# Patient Record
Sex: MALE | Race: WHITE
[De-identification: names, ages, dates, MRNs, and addresses within clinical notes are randomized per-mention and may not be internally consistent; named-entity substitution may affect disease eponyms.]

---

## 2017-04-06 ENCOUNTER — HOSPITAL ENCOUNTER (OUTPATIENT)
Dept: HOSPITAL 45 - C.CTS | Age: 72
Discharge: HOME | End: 2017-04-06
Attending: OTOLARYNGOLOGY
Payer: COMMERCIAL

## 2017-04-06 DIAGNOSIS — J34.2: Primary | ICD-10-CM

## 2017-04-06 NOTE — DIAGNOSTIC IMAGING REPORT
Study: Fusion CT of the sinuses



HISTORY:. Nasal septal deviation. Chronic sinusitis.



FINDINGS: Findings of prior antral window placement. This is patent on the

right, with soft tissue occlusion of the left. The native ostiomeatal units are

patent bilaterally.



Prior partial nasal turbinate resection on the right.. Mild hypertrophic change

of the left and to a lesser extent inferior residual right nasal turbinates.

Mild nasal occlusive change. Very slight nasal septal deviation to the left. The

frontal and sphenoid sinuses are clear.



IMPRESSION: Prior partial right nasal turbinate resection.

2. Mild/moderate hypertrophic change of the residual nasal turbinates.

Mild/moderate nasal occlusive change.





3. Soft tissue occlusion of the antral windows previous replaced with the

ostiomeatal units patent bilaterally.

4. Sinuses specifically are generally clear with only minimal mucosal thickening

of the left maxillary sinus.

5. Minimal nasal septal displacement to the left.







Electronically signed by:  Christian Velasco M.D.

4/6/2017 9:38 AM



Dictated Date/Time:  4/6/2017 9:32 AM

## 2017-04-09 ENCOUNTER — HOSPITAL ENCOUNTER (EMERGENCY)
Dept: HOSPITAL 45 - C.EDB | Age: 72
Discharge: HOME | End: 2017-04-09
Payer: COMMERCIAL

## 2017-04-09 VITALS
WEIGHT: 173.48 LBS | HEIGHT: 64.02 IN | BODY MASS INDEX: 29.62 KG/M2 | HEIGHT: 64.02 IN | WEIGHT: 173.48 LBS | BODY MASS INDEX: 29.62 KG/M2

## 2017-04-09 VITALS — HEART RATE: 72 BPM | DIASTOLIC BLOOD PRESSURE: 76 MMHG | OXYGEN SATURATION: 100 % | SYSTOLIC BLOOD PRESSURE: 118 MMHG

## 2017-04-09 VITALS — TEMPERATURE: 97.7 F

## 2017-04-09 DIAGNOSIS — M75.31: Primary | ICD-10-CM

## 2017-04-09 DIAGNOSIS — M25.511: ICD-10-CM

## 2017-04-09 DIAGNOSIS — I10: ICD-10-CM

## 2017-04-09 DIAGNOSIS — Z96.651: ICD-10-CM

## 2017-04-09 NOTE — EMERGENCY ROOM VISIT NOTE
ED Visit Note


First contact with patient:  11:02


CHIEF COMPLAINT: Right shoulder pain





HISTORY OF PRESENT ILLNESS:  This 71-year-old male presents the ER with chief 

complaint of right shoulder pain.  The patient states that his pain started 2 

days ago.  He denies any known injury.  He does admit that he was driving a lot 

prior to the onset of his symptoms.  The patient was seen at [facility] last 

evening and had an x-ray of the shoulder.  He was told he has calcific 

tendinitis.  He was instructed to [facility] or he 

has been seen in the past.  The patient states that also gave him a 

prescription for naproxen 500 mg.  He states the pharmacy was not open last 

night so this morning he took 2 over-the-counter naproxen which equaled 440 mg 

without any relief of the shoulder pain.  The patient denies any pain radiating 

down his arms or any numbness and tickling in his fingers.





REVIEW OF SYSTEMS:   6 system review was performed and was negative unless 

stated otherwise in history of present illness.





PMH:  The patient is healthy; right knee replacement, 2 spinal surgeries, sinus 

surgery, hypertension, kidney stones





SOCIAL HISTORY:  Patient lives with his wife.  The patient denies any tobacco 

or alcohol use.





PHYSICAL EXAM: Vital Signs: Were reviewed Reviewed nurse's notes.  GEN.: 71-year

-old white male appears uncomfortable secondary to shoulder pain .MENTAL Status

: Alert and oriented 3.  RIGHT shoulder: No gross bony deformity noted.  The 

patient is holding his arm down by his side.  The patient has tenderness 

palpation over the superior aspect of the shoulder joint and extending medial 

towards the right side of his neck.  The patient has limited range of motion in 

all directions due to pain.   strength is 5 out of 5 as compared to the 

left.





EMERGENCY DEPARTMENT COURSE: The patient was evaluated.  I reviewed the patient'

s handout from [facility].  The patient was given Toradol 60 mg IM and 

Norco 5/325 mg 2 tablets by mouth for pain.  The patient was reevaluated and 

was feeling better..  The patient was independently evaluated by Dr. Barrett 

who agree with treatment plan.  The patient was discharged home in stable 

condition.


 


DIAGNOSIS: Calcific tendinitis right shoulder





DISCHARGE INSTRUCTIONS & TREATMENT: Apply ice and/or heat intermittently to the 

affected area, whichever makes it feel better.  Take both the naproxen and the 

Norco as directed for pain.  Do not drive while taking the Norco.  Recommend 

range of motion exercises several times a day to prevent stiffness.  Call 

[facility] on Monday as previously directed for a follow-up 

appointment.


Current/Historical Medications


Scheduled


Amlodipine (Norvasc), 5 MG PO DAILY


Amlodipine (Norvasc), 10 MG PO DAILY


Atorvastatin (Lipitor), 5 MG PO Q2D


Carvedilol (Coreg), 12.5 MG PO BID


Cetirizine (Zyrtec), 10 MG PO DAILY


Diphenoxylate/Atropine (Lomotil), 1 TAB PO UD


Lorazepam (Ativan), 0.5-1 MG PO HS





Scheduled PRN


Calcium Carbonate (Antacid) (Antacid Calcium Regular S), 1 TAB PO UD PRN for GI 

Upset





Allergies


Coded Allergies:  


     Pravastatin (Verified  Allergy, Mild, HIVES, 2/5/10)


     Codeine (Unverified  Allergy, Unknown, VOMIT, 5/3/11)


     Molds and Smuts (Verified  Allergy, Unknown, unk, 4/9/17)


     Eszopiclone (Verified  Adverse Reaction, Intermediate, RESTLESS LEGS, 2/5/

10)


 RESTLESS LEG SYNDROME


     Simvastatin (Verified  Adverse Reaction, Intermediate, GI INTOLERANCE, 2/5/

10)


 GI UPSET





Vital Signs











  Date Time  Temp Pulse Resp B/P Pulse Ox O2 Delivery O2 Flow Rate FiO2


 


4/9/17 10:58 36.5 74 18 127/73 94 Room Air  











Medications Administered











 Medications


  (Trade)  Dose


 Ordered  Sig/Bhumika


 Route  Start Time


 Stop Time Status Last Admin


Dose Admin


 


 Ketorolac


 Tromethamine


  (Toradol Inj)  60 mg  NOW  STAT


 IM  4/9/17 11:09


 4/9/17 11:11 DC 4/9/17 11:25


60 MG


 


 Acetaminophen/


 Hydrocodone Bitart


  (Norco 5/325 Tab)  2 tab  NOW  STAT


 PO  4/9/17 11:09


 4/9/17 11:11 DC 4/9/17 11:25


2 TAB











Departure Information


Referrals


Thaddeus Funez M.D. (PCP)





Patient Instructions


[facility]

## 2017-04-11 NOTE — EMERGENCY ROOM VISIT NOTE
ED Visit Note


First contact with patient:  11:02


I have personally seen and evaluated the patient with the PA.  I agree with the 

diagnosis and management decisions and have been personally involved in the 

case. Please see Elizabeth Bell PA-C's notes for further details of the history, 

physical and visit.

## 2017-05-24 ENCOUNTER — HOSPITAL ENCOUNTER (OUTPATIENT)
Dept: HOSPITAL 45 - C.LABPBG | Age: 72
Discharge: HOME | End: 2017-05-24
Attending: OTOLARYNGOLOGY
Payer: COMMERCIAL

## 2017-05-24 DIAGNOSIS — Z01.818: Primary | ICD-10-CM

## 2017-05-24 LAB
ANION GAP SERPL CALC-SCNC: 8 MMOL/L (ref 3–11)
BASOPHILS # BLD: 0.04 K/UL (ref 0–0.2)
BASOPHILS NFR BLD: 0.5 %
CHLORIDE SERPL-SCNC: 105 MMOL/L (ref 98–107)
CO2 SERPL-SCNC: 25 MMOL/L (ref 21–32)
COMPLETE: YES
EOSINOPHIL NFR BLD AUTO: 305 K/UL (ref 130–400)
HCT VFR BLD CALC: 41.6 % (ref 42–52)
IG%: 0.3 %
IMM GRANULOCYTES NFR BLD AUTO: 23.2 %
INR PPP: 1 (ref 0.9–1.1)
LYMPHOCYTES # BLD: 1.85 K/UL (ref 1.2–3.4)
MCH RBC QN AUTO: 29.8 PG (ref 25–34)
MCHC RBC AUTO-ENTMCNC: 33.7 G/DL (ref 32–36)
MCV RBC AUTO: 88.5 FL (ref 80–100)
MONOCYTES NFR BLD: 8.8 %
NEUTROPHILS # BLD AUTO: 1.1 %
NEUTROPHILS NFR BLD AUTO: 66.1 %
PARTIAL THROMBOPLASTIN RATIO: 1
PMV BLD AUTO: 9 FL (ref 7.4–10.4)
POTASSIUM SERPL-SCNC: 3.7 MMOL/L (ref 3.5–5.1)
PROTHROMBIN TIME: 10.9 SECONDS (ref 9–12)
RBC # BLD AUTO: 4.7 M/UL (ref 4.7–6.1)
SODIUM SERPL-SCNC: 138 MMOL/L (ref 136–145)
WBC # BLD AUTO: 7.98 K/UL (ref 4.8–10.8)

## 2017-06-05 ENCOUNTER — HOSPITAL ENCOUNTER (OUTPATIENT)
Dept: HOSPITAL 45 - X.SURG | Age: 72
Discharge: HOME | End: 2017-06-05
Attending: OTOLARYNGOLOGY
Payer: COMMERCIAL

## 2017-06-05 VITALS — TEMPERATURE: 97.52 F

## 2017-06-05 VITALS — OXYGEN SATURATION: 95 % | SYSTOLIC BLOOD PRESSURE: 187 MMHG | DIASTOLIC BLOOD PRESSURE: 79 MMHG | HEART RATE: 75 BPM

## 2017-06-05 VITALS
BODY MASS INDEX: 27.72 KG/M2 | WEIGHT: 162.35 LBS | BODY MASS INDEX: 27.72 KG/M2 | HEIGHT: 64.02 IN | WEIGHT: 162.35 LBS | HEIGHT: 64.02 IN

## 2017-06-05 DIAGNOSIS — J34.2: ICD-10-CM

## 2017-06-05 DIAGNOSIS — J32.9: Primary | ICD-10-CM

## 2017-06-05 DIAGNOSIS — E78.5: ICD-10-CM

## 2017-06-05 DIAGNOSIS — E78.00: ICD-10-CM

## 2017-06-05 DIAGNOSIS — J34.3: ICD-10-CM

## 2017-06-05 DIAGNOSIS — I10: ICD-10-CM

## 2017-06-05 DIAGNOSIS — J34.89: ICD-10-CM

## 2017-06-05 DIAGNOSIS — K21.9: ICD-10-CM

## 2017-06-05 NOTE — DISCHARGE INSTRUCTIONS
Discharge Instructions


Date of Service


Jun 5, 2017.





Admission


Reason for Admission:  Chronic Sinusitis, Nasal Septal Deviation, Hypertr





Discharge


Discharge Diagnosis / Problem:  SAME





Discharge Goals


Goal(s):  Therapeutic intervention





Activity Recommendations


Activity Limitations:  as noted below


1. LIGHT ACTIVITY AND NO NOSE BLOWING FOR 2 WEEKS


2. NO DRIVING WHILE ON NORCO


.





Current Hospital Diet


Patient's current hospital diet:





Discharge Diet


Recommended Diet:  Regular Diet





Procedures


Procedures Performed:  


REVISION BILATERAL ENDOSCOPIC SINUS SURGERY, REVISION SEPTOPLASTY,


REVISION BILATERAL INFERIOR TURBINATE REDUCTION, ENDOSCOPIC LEFT NASAL


LYSIS OF ADHESIONS





Pending Studies


Studies pending at discharge:  no





Medical Emergencies








.


Who to Call and When:





Medical Emergencies:  If at any time you feel your situation is an emergency, 

please call 911 immediately.





.





Non-Emergent Contact


Non-Emergency issues call your:  Surgeon





.


.








"Provider Documentation" section prepared by Kayden Mitchell.








.





VTE Core Measure


Inpt VTE Proph given/why not?:  SCD's

## 2017-06-05 NOTE — [facility] OPERATIVE REPORT
Operative Report


Operative Date


Jun 5, 2017.





Pre-Operative Diagnosis





Chronic Sinusitis, Nasal Septal Deviation, Bilateral Inferior Turbinate 


Hypertrophy, Left Nasal Adhesions.





Post-Operative Diagnosis





same as preop





Procedure(s) Performed





REVISION BILATERAL ENDOSCOPIC SINUS SURGERY, REVISION SEPTOPLASTY,


REVISION BILATERAL INFERIOR TURBINATE REDUCTION, ENDOSCOPIC LEFT NASAL


LYSIS OF ADHESIONS





Surgeon


Dr. Mitchell





Assistant Surgeon(s)


none





Estimated Blood Loss


10ML





Findings


1. BILATERAL ROSS BULLOSA WITH LEFT MIDDLE TURBINATE SCARRED DOWN TO LATERAL 

NASAL WALL


2. ADHESION BETWEEN LEFT SEPTUM AND INFERIOR TURBINATE


3. THICKENED NASAL SEPTUM CAUSING LEFT ANTEROSUPERIOR SEPTAL DEVIATION


4. BILATERAL INFERIOR TURBINATE HYPERTROPHY





Specimens





none per surgeon


I attest to the content of the Intraoperative Record and any orders documented 

therein.  Any exceptions are noted below.

## 2017-06-05 NOTE — ANESTHESIA PROGRESS NT - [facility]
Anesthesia Post Op Note


Date & Time


Jun 5, 2017 at 12:49





Vital Signs


Pain Intensity:  0





Vital Signs Past 12 Hours








  Date Time  Temp Pulse Resp B/P (MAP) Pulse Ox O2 Delivery O2 Flow Rate FiO2


 


6/5/17 12:28  73 21  85   


 


6/5/17 12:28  72 21     


 


6/5/17 12:27    163/81    


 


6/5/17 12:25 36.6 71 14 163/81 97 Room Air  


 


6/5/17 12:23  71 18  94   


 


6/5/17 12:23  71 18     


 


6/5/17 12:22    172/86    


 


6/5/17 12:18  69 12  93   


 


6/5/17 12:18  68 12     


 


6/5/17 12:17    148/86    


 


6/5/17 12:15    167/80    


 


6/5/17 12:13  67 8     


 


6/5/17 12:13  65 8  96   


 


6/5/17 12:12    177/112    


 


6/5/17 12:08  63 5     


 


6/5/17 12:08  64 5  96   


 


6/5/17 12:07    163/90    


 


6/5/17 12:03  65 15     


 


6/5/17 12:03  64 15  95   


 


6/5/17 12:02    159/89    


 


6/5/17 11:58  67 14     


 


6/5/17 11:58  67 14  95   


 


6/5/17 11:57    160/90    


 


6/5/17 11:53  62 10  94   


 


6/5/17 11:53  62 10     


 


6/5/17 11:52    165/92    


 


6/5/17 11:48  71 11     


 


6/5/17 11:48  72 11  96   


 


6/5/17 11:47    167/90    


 


6/5/17 11:46  67 15  95   


 


6/5/17 11:46  68 15     


 


6/5/17 11:42    167/94    


 


6/5/17 11:41  70 16     


 


6/5/17 11:41  69 16  95   


 


6/5/17 11:38    165/89    


 


6/5/17 11:36 36.4 77 16 165/89 96 Humidified Oxygen 6 





      Diffusion Mask  


 


6/5/17 08:55 36.3 67 20 166/92 (116) 95 Room Air  











Notes


Mental Status:  alert / awake / arousable, participated in evaluation


Pt Amnestic to Procedure:  Yes


Nausea / Vomiting:  adequately controlled


Pain:  adequately controlled


Airway Patency, RR, SpO2:  stable & adequate


BP & HR:  stable & adequate


Hydration State:  stable & adequate


Anesthetic Complications:  no major complications apparent

## 2017-06-05 NOTE — HISTORY AND PHYSICAL: SURG CNT
History & Physical


Date


Jun 5, 2017.





Chief Complaint


CHRONIC SINUSITIS, SEPTAL DEVIATION, BILATERAL INFERIOR TURBINATE HYPERTROPHY, 

LEFT NASAL ADHESIONS





History of Present Illness


The patient is a 71 year old male with complaints of CHRONIC SINUSITIS, SEPTAL 

DEVIATION, BILATERAL INFERIOR TURBINATE HYPERTROPHY, LEFT NASAL ADHESIONS 

UNRESPONSIVE TO MAXIMAL MEDICAL RX.








Past Medical/Surgical History


PMH:  ANXIETY, DYSLIPIDEMIA, HTN, IBS, LUMBAR STENOSIS


PSH: S/P B FESS, S/P LUMBAR LAMINECTOMY, S/P KNEE REPLACEMENT, S/P R KNEE 

ARTHROSCOPY





Additional History


Hepatic Disease:  No


Endocrine Disorder:  No


Kidney Disease:  No


Hypertension:  No


Heart Disease:  No


Bleeding Tendencies:  No


Infectious Diseases:  No





Allergies


Coded Allergies:  


     Pravastatin (Verified  Allergy, Mild, HIVES, 6/5/17)


     Molds and Smuts (Verified  Allergy, Unknown, unk, 6/5/17)


     Eszopiclone (Verified  Adverse Reaction, Intermediate, RESTLESS LEGS, 6/5/ 17)


 RESTLESS LEG SYNDROME


     Simvastatin (Verified  Adverse Reaction, Intermediate, GI INTOLERANCE, 6/5/ 17)


 GI UPSET


     Codeine (Verified  Adverse Reaction, Unknown, VOMIT, 6/5/17)





Home Medications


Scheduled


Amlodipine (Norvasc), 5 MG PO QAM


Atorvastatin (Lipitor), 5 MG PO Q2D


B-Complex Vitamins (Vitamin B Complex), 1 TAB PO QPM


Carvedilol (Coreg), 1 TAB PO BID


Celecoxib (CeleBREX), 200 MG PO QAM


Cetirizine (Zyrtec), 10 MG PO QPM


Cholecalciferol (Vitamin D), 1 TAB PO QPM


Cinnamon (Cinnamon), 1 CAP PO QPM


Lorazepam (Ativan), 0.5-1 MG PO HS


Magnesium Oxide (Magnesium), 1 TAB PO QPM


Misc Natural Products ([place]), 1 CAP PO QPM


Multivitamin (Multivitamin), 1 TAB PO QPM


Omeprazole (Prilosec), 20 MG PO QAM


Potassium (Potassium), 1 TAB PO QAM





Physical Examination


Skin:  warm/dry, no rash


Eyes:  normal inspection, EOMI, sclerae normal


ENT:  + pertinent finding (HYPONASAL VOICE, L DNS WITH ADHESION BETWEEEN SEPTUM 

AND INFERIOR TURBINATE, R>L ITH)


Head:  normocephalic, atraumatic


Neck:  supple, no adenopathy, trachea midline


Respiratory/Chest:  lungs clear, normal breath sounds, no respiratory distress


Cardiovascular:  regular rate, rhythm, no edema, no murmur


Neurologic/Psych:  no motor/sensory deficits, alert, normal reflexes, oriented 

x 3





Diagnosis


CHRONIC SINUSITIS, SEPTAL DEVIATION, BILATERAL INFERIOR TURBINATE HYPERTROPHY, 

LEFT NASAL ADHESIONS





Plan of Treatment


REVISION B FESS/SEPTOPLASTY/INFERIOR TURBINATE REDUCTION

## 2017-06-05 NOTE — OPERATIVE REPORT
DATE OF OPERATION:  06/05/2017

 

PREOPERATIVE DIAGNOSES:

1.  Chronic rhinosinusitis.

2.  Left septal deviation.

3.  Bilateral inferior turbinate hypertrophy.

4.  Left sinonasal adhesions.

 

POSTOPERATIVE DIAGNOSES:

1.  Chronic rhinosinusitis.

2.  Left septal deviation.

3.  Bilateral inferior turbinate hypertrophy.

4.  Left sinonasal adhesions.

 

PROCEDURE:  Revision, bilateral endoscopic sinus surgery consisting of:

1.  Bilateral revision maxillary antrostomies.

2.  Bilateral revision complete ethmoidectomies.

3.  Bilateral endoscopic bakari bullosa resection.

4.  Left nasal lysis of adhesions/debridement.

5.  Septoplasty.

6.  Bilateral inferior turbinate outfracture and turbinoplasty.

 

SURGEON:  Kayden Mitchell MD

 

ANESTHESIA:  General endotracheal.

 

ESTIMATED BLOOD LOSS:  10 mL.

 

FINDINGS:

1.  Very narrow nostrils bilaterally.

2.  Left septal deviation anterosuperiorly.

3.  Adhesion between the left septum and the inferior turbinate.

4.  Adhesion between the left middle turbinate and lateral nasal wall

blocking the left maxillary antrostomy and complete ethmoidectomy that was

performed by another surgeon.

5.  Narrow bilateral maxillary antrostomies.

6.  Mild mucosal thickening involving the bilateral ethmoid sinuses.

7.  Bilateral bakari bullosa.

 

SPECIMENS:  None.

 

COMPLICATIONS:  None.

 

INDICATIONS FOR THE PROCEDURE:  The patient is a 71-year-old male who has

undergone endoscopic sinus surgery, septoplasty, inferior turbinate reduction

by another otolaryngologist in the past.  He has continued to have problems

primarily with left-sided sinonasal congestion and also recurrent sinus

infections.  A posttreatment CT scan sinuses revealed an ____ as well as on

exam revealed an adhesion between the left nasal septum and the left inferior

turbinate.  There was also an adhesion between the left middle turbinate and

lateral nasal wall, blocking the ethmoid cavity and maxillary sinus

antrostomy.  There was bilateral bakari bullosa.  There is mild mucosal

thickening within the left maxillary sinus.  The patient presents for the

above-mentioned procedures on an outpatient elective basis.

 

DETAILS OF PROCEDURE:  After informed consent had been obtained from the

patient, the patient was wheeled to the operating room and placed on the

operating table in the supine position.  Monitors were placed.  After

induction of general endotracheal anesthesia, the patient was prepped in the

usual fashion for endoscopic sinus surgery.  Lidocaine and epinephrine

pledgets were placed in the bilateral nasal cavities and pressure applied.

 

The left-sided pledgets were removed.  Endoscopically, there was an adhesion

between the septum and the inferior turbinate, which was lysed using a freer

elevator.  There was also an adhesion between the left middle turbinate and

lateral nasal wall, which was also lysed with a freer elevator after first

injecting the middle turbinate and lateral nasal wall with 1% lidocaine with

1:100,000 epinephrine.  The middle turbinate was then medialized.  Lidocaine

and epinephrine pledget was then placed in the left middle meatus.

 

The right side was then addressed.  The right middle turbinate and lateral

nasal wall injected with 1% lidocaine with 1:100,000 epinephrine.  A

lidocaine and epinephrine pledget was then placed in the right middle meatus.

 

The left pledget was removed.  Powered instrumentation was used to remove the

scar tissue and adhesion between the left middle turbinate and lateral nasal

wall.  There was some small residual uncinate process, which was removed

using powered instrumentation.  An endoscopic bakari bullosa resection was

then performed using straight Yasir-Cut forceps and powered instrumentation

removing the lateral half of the left middle turbinate.  The previous left

maxillary antrostomy was quite narrow and this was enlarged anteriorly,

inferiorly, and posteriorly using straight Yasir-Cut forceps, and powered

instrumentation.  A revision complete ethmoidectomy was then performed using

powered instrumentation.  A lidocaine and epinephrine pledget was then placed

in the left ethmoid cavity.

 

The right side was then addressed.  On this side, there was also a small

bakari bullosa which was removed as described on the left hand side.  The

previous right maxillary antrostomy was also enlarged as well as and revision

complete ethmoidectomy was performed, as described on the left hand side.

 

The nasal septum was then injected with 1% lidocaine with 1:100,000

epinephrine.  Lidocaine and epinephrine pledgets were placed in the bilateral

nasal cavities and pressure applied.  After allowing adequate time for

vasoconstriction, the pledgets were removed and a #15 scalpel was used to

make a left [place] incision through which a left-sided mucoperichondrial

mucoperiosteal flap was elevated.  A #15 scalpel was then used to incise the

quadrangular cartilage with care to preserve a 1.5 cm dorsal and caudal

strut.  The right-sided mucoperichondrial mucoperiosteal flap was then

elevated.  Of note, the patient has very thickened septal cartilage, which

was impinging on the nasal airway anterosuperiorly on the left hand side. 

Ross-Jimenes forceps was used to remove deviated portions of the

quadrangular cartilage as well as septal bone which was causing this

obstruction.  The septal cavity was then suctioned.  The left [place]

incision was closed with several simple interrupted 4-0 chromic sutures.  A

4-0 plain gut suture on a Harvinder needle was then used to perform a quilting

stitch of the mucoperichondrial mucoperiosteal flaps bilaterally to help

prevent septal hematoma.

 

A Maya elevator was then used to infracture and subsequently outfracture

the inferior turbinates bilaterally.  The inferior turbinates were injected

with 1% lidocaine with 1:100,000 epinephrine.  A 2.0 mm turbinate blade using

powered instrumentation was then used to perform bilateral inferior

turbinoplasties in a submucosal fashion.

 

The sinonasal cavities and nasopharynx were then suctioned.  Merogel was

placed in the bilateral ethmoid cavities.  An orogastric tube was placed and

the stomach was suctioned free of air and stomach contents.

 

This marked the end of the case.  The patient tolerated the procedure well. 

There were no apparent complications.  The patient was extubated and

transferred to recovery room in stable condition.

 

 

I attest to the content of the Intraoperative Record and any orders documented therein. Any exception
s are noted below.

## 2017-07-11 ENCOUNTER — HOSPITAL ENCOUNTER (OUTPATIENT)
Dept: HOSPITAL 45 - C.LABPBG | Age: 72
Discharge: HOME | End: 2017-07-11
Attending: INTERNAL MEDICINE
Payer: COMMERCIAL

## 2017-07-11 DIAGNOSIS — R73.01: ICD-10-CM

## 2017-07-11 DIAGNOSIS — M48.06: Primary | ICD-10-CM

## 2017-07-11 DIAGNOSIS — Z12.5: ICD-10-CM

## 2017-07-11 LAB
ALBUMIN/GLOB SERPL: 1 {RATIO} (ref 0.9–2)
ALP SERPL-CCNC: 50 U/L (ref 45–117)
ALT SERPL-CCNC: 21 U/L (ref 12–78)
ANION GAP SERPL CALC-SCNC: 6 MMOL/L (ref 3–11)
AST SERPL-CCNC: 8 U/L (ref 15–37)
BUN SERPL-MCNC: 13 MG/DL (ref 7–18)
BUN/CREAT SERPL: 16.7 (ref 10–20)
CALCIUM SERPL-MCNC: 8.9 MG/DL (ref 8.5–10.1)
CHLORIDE SERPL-SCNC: 103 MMOL/L (ref 98–107)
CHOLEST/HDLC SERPL: 3.3 {RATIO}
CO2 SERPL-SCNC: 28 MMOL/L (ref 21–32)
CREAT SERPL-MCNC: 0.78 MG/DL (ref 0.6–1.4)
EST. AVERAGE GLUCOSE BLD GHB EST-MCNC: 120 MG/DL
GLOBULIN SER-MCNC: 3.3 GM/DL (ref 2.5–4)
GLUCOSE SERPL-MCNC: 94 MG/DL (ref 70–99)
GLUCOSE UR QL: 59 MG/DL
KETONES UR QL STRIP: 107 MG/DL
NITRITE UR QL STRIP: 145 MG/DL (ref 0–150)
PH UR: 195 MG/DL (ref 0–200)
POTASSIUM SERPL-SCNC: 3.8 MMOL/L (ref 3.5–5.1)
PSA SERPL-MCNC: 2.52 NG/ML (ref 0–4)
SODIUM SERPL-SCNC: 137 MMOL/L (ref 136–145)
VERY LOW DENSITY LIPOPROT CALC: 29 MG/DL

## 2017-07-19 NOTE — CODING QUERY MEDICAL NECESSITY
SUPPORTING DIAGNOSIS NEEDED





A supporting diagnosis is required for the test/procedure performed on this patient in 
order for us to be reimbursed by the patient's insurance. Please provide a supporting 
diagnosis for the following test/procedure listed below next to the test name along with 
your signature. 



*If there is no additional diagnosis for this patient that would support the following 
test/procedure please document that below next to the test/procedure.



Test(s)/Procedure(s) that require a supporting diagnosis:







* PSA                                  DIAGNOSIS:







* HEMOGLOBIN A1C                          DIAGNOSIS:







Provider Signature:  ______________________________  Date:  _______



Thank you  

Aleta Newberry

[facility] [employer] [facility]

Phone:  [phone]

Fax:  [phone]



Once completed, please kindly fax back to [phone]



For questions please call [phone]

## 2018-04-10 ENCOUNTER — HOSPITAL ENCOUNTER (OUTPATIENT)
Dept: HOSPITAL 45 - C.LABPBG | Age: 73
Discharge: HOME | End: 2018-04-10
Attending: INTERNAL MEDICINE
Payer: COMMERCIAL

## 2018-04-10 DIAGNOSIS — M48.061: ICD-10-CM

## 2018-04-10 DIAGNOSIS — I10: Primary | ICD-10-CM

## 2018-04-10 DIAGNOSIS — M54.5: ICD-10-CM

## 2018-04-10 DIAGNOSIS — R73.01: ICD-10-CM

## 2018-04-10 DIAGNOSIS — J32.9: ICD-10-CM

## 2018-04-10 DIAGNOSIS — E78.5: ICD-10-CM

## 2018-04-10 LAB
ALBUMIN SERPL-MCNC: 3.5 GM/DL (ref 3.4–5)
ALP SERPL-CCNC: 46 U/L (ref 45–117)
ALT SERPL-CCNC: 22 U/L (ref 12–78)
AST SERPL-CCNC: 9 U/L (ref 15–37)
BASOPHILS # BLD: 0.05 K/UL (ref 0–0.2)
BASOPHILS NFR BLD: 0.8 %
BUN SERPL-MCNC: 17 MG/DL (ref 7–18)
CALCIUM SERPL-MCNC: 8.7 MG/DL (ref 8.5–10.1)
CO2 SERPL-SCNC: 28 MMOL/L (ref 21–32)
CREAT SERPL-MCNC: 0.93 MG/DL (ref 0.6–1.4)
EOS ABS #: 0.3 K/UL (ref 0–0.5)
EOSINOPHIL NFR BLD AUTO: 286 K/UL (ref 130–400)
GLUCOSE SERPL-MCNC: 107 MG/DL (ref 70–99)
HBA1C MFR BLD: 5.9 % (ref 4.5–5.6)
HCT VFR BLD CALC: 42 % (ref 42–52)
HGB BLD-MCNC: 14.5 G/DL (ref 14–18)
IG#: 0.02 K/UL (ref 0–0.02)
IMM GRANULOCYTES NFR BLD AUTO: 31.4 %
KETONES UR QL STRIP: 127 MG/DL
LYMPHOCYTES # BLD: 2.02 K/UL (ref 1.2–3.4)
MCH RBC QN AUTO: 30.2 PG (ref 25–34)
MCHC RBC AUTO-ENTMCNC: 34.5 G/DL (ref 32–36)
MCV RBC AUTO: 87.5 FL (ref 80–100)
MONO ABS #: 0.87 K/UL (ref 0.11–0.59)
MONOCYTES NFR BLD: 13.5 %
NEUT ABS #: 3.18 K/UL (ref 1.4–6.5)
NEUTROPHILS # BLD AUTO: 4.7 %
NEUTROPHILS NFR BLD AUTO: 49.3 %
PH UR: 214 MG/DL (ref 0–200)
PMV BLD AUTO: 8.9 FL (ref 7.4–10.4)
POTASSIUM SERPL-SCNC: 3.9 MMOL/L (ref 3.5–5.1)
PROT SERPL-MCNC: 6.9 GM/DL (ref 6.4–8.2)
RED CELL DISTRIBUTION WIDTH CV: 13.7 % (ref 11.5–14.5)
RED CELL DISTRIBUTION WIDTH SD: 43.7 FL (ref 36.4–46.3)
SODIUM SERPL-SCNC: 135 MMOL/L (ref 136–145)
WBC # BLD AUTO: 6.44 K/UL (ref 4.8–10.8)

## 2018-07-11 LAB
BASOPHILS # BLD: 0.04 K/UL (ref 0–0.2)
BASOPHILS NFR BLD: 0.5 %
BUN SERPL-MCNC: 14 MG/DL (ref 7–18)
CALCIUM SERPL-MCNC: 8.3 MG/DL (ref 8.5–10.1)
CO2 SERPL-SCNC: 27 MMOL/L (ref 21–32)
CREAT SERPL-MCNC: 0.92 MG/DL (ref 0.6–1.4)
EOS ABS #: 0.38 K/UL (ref 0–0.5)
EOSINOPHIL NFR BLD AUTO: 324 K/UL (ref 130–400)
GLUCOSE SERPL-MCNC: 114 MG/DL (ref 70–99)
HCT VFR BLD CALC: 42.9 % (ref 42–52)
HGB BLD-MCNC: 14.7 G/DL (ref 14–18)
IG#: 0.02 K/UL (ref 0–0.02)
IMM GRANULOCYTES NFR BLD AUTO: 26.7 %
INR PPP: 1 (ref 0.9–1.1)
LYMPHOCYTES # BLD: 2.22 K/UL (ref 1.2–3.4)
MCH RBC QN AUTO: 29.6 PG (ref 25–34)
MCHC RBC AUTO-ENTMCNC: 34.3 G/DL (ref 32–36)
MCV RBC AUTO: 86.5 FL (ref 80–100)
MONO ABS #: 1 K/UL (ref 0.11–0.59)
MONOCYTES NFR BLD: 12 %
NEUT ABS #: 4.65 K/UL (ref 1.4–6.5)
NEUTROPHILS # BLD AUTO: 4.6 %
NEUTROPHILS NFR BLD AUTO: 56 %
PMV BLD AUTO: 9.2 FL (ref 7.4–10.4)
POTASSIUM SERPL-SCNC: 4 MMOL/L (ref 3.5–5.1)
PTT PATIENT: 26.6 SECONDS (ref 21–31)
RED CELL DISTRIBUTION WIDTH CV: 13 % (ref 11.5–14.5)
RED CELL DISTRIBUTION WIDTH SD: 41.3 FL (ref 36.4–46.3)
SODIUM SERPL-SCNC: 136 MMOL/L (ref 136–145)
WBC # BLD AUTO: 8.31 K/UL (ref 4.8–10.8)

## 2018-07-11 NOTE — PAT MEDICATION INSTRUCTIONS
Service Date


Jul 11, 2018.





Current Home Medication List


Amlodipine (Norvasc), 5 MG PO QAM


Atorvastatin (Lipitor), 5 MG PO Q2D


Carvedilol (Coreg), 1 TAB PO BID


Cetirizine (Zyrtec), 10 MG PO QPM


Cinnamon (Cinnamon), 1 CAP PO QPM


Krill Oil (Krill Oil), 1 TAB PO QPM


Lorazepam (Ativan), 0.5-1 MG PO HS


Magnesium Oxide (Magnesium), 1 TAB PO QPM


Misc Natural Products (Saw [place]), 1 CAP PO QPM


Omeprazole (Prilosec), 20 MG PO QAM


Potassium (Potassium), 1 TAB PO QPM


Tramadol (Ultram), 25 MG PO Q8H PRN for Pain





Medication Instructions


For Your Scheduled Surgery 








- Hold the following medications 2 weeks prior to surgery:


Cinnamon (Cinnamon), 1 CAP PO QPM


Krill Oil (Krill Oil), 1 TAB PO QPM


Misc Natural Products (Saw [place]), 1 CAP PO QPM








- Take the following medications the morning of surgery with a sip of water:


Amlodipine (Norvasc), 5 MG PO QAM


Carvedilol (Coreg), 1 TAB PO BID


Omeprazole (Prilosec), 20 MG PO QAM


Tramadol (Ultram), 25 MG PO Q8H PRN for Pain (if needed, may be taken up to 

four hours before surgery)








- Take the following medications as scheduled the night before surgery:


Atorvastatin (Lipitor), 5 MG PO Q2D


Carvedilol (Coreg), 1 TAB PO BID


Cetirizine (Zyrtec), 10 MG PO QPM


Lorazepam (Ativan), 0.5-1 MG PO HS


Magnesium Oxide (Magnesium), 1 TAB PO QPM


Potassium (Potassium), 1 TAB PO QPM


Tramadol (Ultram), 25 MG PO Q8H PRN for Pain (if needed)








If you have any questions please call us at [phone] or [phone] or 

[phone]

## 2018-07-11 NOTE — DIAGNOSTIC IMAGING REPORT
CHEST 2 VIEWS ROUTINE



CLINICAL HISTORY: Preoperative chest    



COMPARISON STUDY:  April 2014



FINDINGS: The cardiac and mediastinal contours are normal. There is no evidence

of focal pulmonary consolidation. There is no evidence of failure. No pleural

effusions are visualized.[ 



IMPRESSION: No active disease in the chest.







Electronically signed by:  Joey Sotelo M.D.

7/11/2018 11:19 AM



Dictated Date/Time:  7/11/2018 11:19 AM

## 2018-07-25 ENCOUNTER — HOSPITAL ENCOUNTER (INPATIENT)
Dept: HOSPITAL 45 - C.ACU | Age: 73
LOS: 2 days | Discharge: HOME | DRG: 454 | End: 2018-07-27
Attending: ORTHOPAEDIC SURGERY | Admitting: ORTHOPAEDIC SURGERY
Payer: COMMERCIAL

## 2018-07-25 VITALS
BODY MASS INDEX: 30.52 KG/M2 | BODY MASS INDEX: 30.52 KG/M2 | HEIGHT: 65 IN | BODY MASS INDEX: 30.52 KG/M2 | HEIGHT: 65 IN | WEIGHT: 183.2 LBS | BODY MASS INDEX: 30.52 KG/M2 | WEIGHT: 183.2 LBS

## 2018-07-25 VITALS
HEART RATE: 68 BPM | DIASTOLIC BLOOD PRESSURE: 73 MMHG | OXYGEN SATURATION: 92 % | TEMPERATURE: 97.88 F | SYSTOLIC BLOOD PRESSURE: 136 MMHG

## 2018-07-25 VITALS
DIASTOLIC BLOOD PRESSURE: 85 MMHG | SYSTOLIC BLOOD PRESSURE: 151 MMHG | TEMPERATURE: 97.7 F | OXYGEN SATURATION: 94 % | HEART RATE: 71 BPM

## 2018-07-25 VITALS
DIASTOLIC BLOOD PRESSURE: 72 MMHG | OXYGEN SATURATION: 93 % | HEART RATE: 68 BPM | SYSTOLIC BLOOD PRESSURE: 136 MMHG | TEMPERATURE: 97.88 F

## 2018-07-25 VITALS
TEMPERATURE: 97.88 F | HEART RATE: 71 BPM | SYSTOLIC BLOOD PRESSURE: 122 MMHG | DIASTOLIC BLOOD PRESSURE: 69 MMHG | OXYGEN SATURATION: 92 %

## 2018-07-25 VITALS
OXYGEN SATURATION: 94 % | SYSTOLIC BLOOD PRESSURE: 162 MMHG | HEART RATE: 62 BPM | DIASTOLIC BLOOD PRESSURE: 78 MMHG | TEMPERATURE: 98.24 F

## 2018-07-25 VITALS
HEART RATE: 69 BPM | OXYGEN SATURATION: 96 % | DIASTOLIC BLOOD PRESSURE: 73 MMHG | TEMPERATURE: 97.52 F | SYSTOLIC BLOOD PRESSURE: 138 MMHG

## 2018-07-25 VITALS — SYSTOLIC BLOOD PRESSURE: 137 MMHG | DIASTOLIC BLOOD PRESSURE: 76 MMHG | HEART RATE: 80 BPM

## 2018-07-25 VITALS
TEMPERATURE: 98.06 F | SYSTOLIC BLOOD PRESSURE: 135 MMHG | DIASTOLIC BLOOD PRESSURE: 75 MMHG | OXYGEN SATURATION: 90 % | HEART RATE: 78 BPM

## 2018-07-25 DIAGNOSIS — Z79.899: ICD-10-CM

## 2018-07-25 DIAGNOSIS — J30.9: ICD-10-CM

## 2018-07-25 DIAGNOSIS — E66.9: ICD-10-CM

## 2018-07-25 DIAGNOSIS — E73.9: ICD-10-CM

## 2018-07-25 DIAGNOSIS — Z87.891: ICD-10-CM

## 2018-07-25 DIAGNOSIS — Z88.8: ICD-10-CM

## 2018-07-25 DIAGNOSIS — Z98.1: ICD-10-CM

## 2018-07-25 DIAGNOSIS — Z88.5: ICD-10-CM

## 2018-07-25 DIAGNOSIS — K21.9: ICD-10-CM

## 2018-07-25 DIAGNOSIS — I10: ICD-10-CM

## 2018-07-25 DIAGNOSIS — M48.062: Primary | ICD-10-CM

## 2018-07-25 DIAGNOSIS — R09.02: ICD-10-CM

## 2018-07-25 DIAGNOSIS — J98.11: ICD-10-CM

## 2018-07-25 DIAGNOSIS — E78.5: ICD-10-CM

## 2018-07-25 PROCEDURE — 0SG00AJ FUSION OF LUMBAR VERTEBRAL JOINT WITH INTERBODY FUSION DEVICE, POSTERIOR APPROACH, ANTERIOR COLUMN, OPEN APPROACH: ICD-10-PCS | Performed by: ORTHOPAEDIC SURGERY

## 2018-07-25 PROCEDURE — 0SG1071 FUSION OF 2 OR MORE LUMBAR VERTEBRAL JOINTS WITH AUTOLOGOUS TISSUE SUBSTITUTE, POSTERIOR APPROACH, POSTERIOR COLUMN, OPEN APPROACH: ICD-10-PCS | Performed by: ORTHOPAEDIC SURGERY

## 2018-07-25 PROCEDURE — 0ST20ZZ RESECTION OF LUMBAR VERTEBRAL DISC, OPEN APPROACH: ICD-10-PCS | Performed by: ORTHOPAEDIC SURGERY

## 2018-07-25 PROCEDURE — 0SP004Z REMOVAL OF INTERNAL FIXATION DEVICE FROM LUMBAR VERTEBRAL JOINT, OPEN APPROACH: ICD-10-PCS | Performed by: ORTHOPAEDIC SURGERY

## 2018-07-25 PROCEDURE — 0RG6071 FUSION OF THORACIC VERTEBRAL JOINT WITH AUTOLOGOUS TISSUE SUBSTITUTE, POSTERIOR APPROACH, POSTERIOR COLUMN, OPEN APPROACH: ICD-10-PCS | Performed by: ORTHOPAEDIC SURGERY

## 2018-07-25 RX ADMIN — SODIUM CHLORIDE SCH MLS/HR: 900 INJECTION, SOLUTION INTRAVENOUS at 14:23

## 2018-07-25 RX ADMIN — FENTANYL CITRATE PRN MCG: 50 INJECTION, SOLUTION INTRAMUSCULAR; INTRAVENOUS at 12:41

## 2018-07-25 RX ADMIN — Medication SCH MG: at 21:35

## 2018-07-25 RX ADMIN — OXYCODONE HYDROCHLORIDE PRN MG: 5 TABLET ORAL at 19:07

## 2018-07-25 RX ADMIN — CEFAZOLIN SCH MLS/MIN: 10 INJECTION, POWDER, FOR SOLUTION INTRAVENOUS at 18:00

## 2018-07-25 RX ADMIN — CARVEDILOL SCH MG: 12.5 TABLET, FILM COATED ORAL at 21:35

## 2018-07-25 RX ADMIN — STANDARDIZED SENNA CONCENTRATE AND DOCUSATE SODIUM SCH TAB: 8.6; 5 TABLET ORAL at 21:35

## 2018-07-25 RX ADMIN — FENTANYL CITRATE PRN MCG: 50 INJECTION, SOLUTION INTRAMUSCULAR; INTRAVENOUS at 12:56

## 2018-07-25 RX ADMIN — SODIUM CHLORIDE SCH MLS/HR: 900 INJECTION, SOLUTION INTRAVENOUS at 20:54

## 2018-07-25 RX ADMIN — FENTANYL CITRATE PRN MCG: 50 INJECTION, SOLUTION INTRAMUSCULAR; INTRAVENOUS at 12:48

## 2018-07-25 RX ADMIN — OXYCODONE HYDROCHLORIDE PRN MG: 5 TABLET ORAL at 14:24

## 2018-07-25 RX ADMIN — CETIRIZINE HYDROCHLORIDE SCH MG: 10 TABLET, FILM COATED ORAL at 21:36

## 2018-07-25 NOTE — DIAGNOSTIC IMAGING REPORT
LUMBAR SPINE 2 OR 3 VIEW



CLINICAL HISTORY: L2-4 REMOVE HARDWARE/T11-S1 DECOMPRESSION/FUSION    



TECHNIQUE: Image intensifier.



COMPARISON STUDY:  None



FINDINGS: Fluoroscopy time 23 seconds. 7 images acquired. Findings consistent

with L1-L2 hardware removal and T8 through through L4 decompression and fusion.



IMPRESSION:  T11-L4 decompression and fusion. 









The above report was generated using voice recognition software.  It may contain

grammatical, syntax or spelling errors.







Electronically signed by:  Christian Velasco M.D.

7/25/2018 12:24 PM



Dictated Date/Time:  7/25/2018 12:23 PM

## 2018-07-25 NOTE — MEDICAL CONSULT
Consultation


Date of Consultation:


Jul 25, 2018.


Attending Physician:


Marco Fletcher D.O.


History of Present Illness


72y/oM with hx of HTN, HLD, IBS with lactose intolerance, allergic rhinitis and 

osteoarthritis s/p T11-L4 decompression/fusion surgery day 0 evaluated per 

surgeon consultation. Pt reports pain well controlled on current pain regimen 

of tylenol and oxycodone. Denies any f/c, HA/dizziness, sob, cp, abdominal pain

, n/v, d/c. Pt on lomax and had normal BM this morning pre-op. No other 

concerns at this time.





Past Medical/Surgical History


Medical Problems:


(1) Calcific tendinitis of right shoulder


Status: Acute  











Social History


Smoking Status:  Former Smoker


Marital Status:  





Allergies


Coded Allergies:  


     Pravastatin (Verified  Allergy, Mild, HIVES, 7/25/18)


     Molds and Smuts (Verified  Allergy, Unknown, NASAL CONGESTION, 7/25/18)


     Eszopiclone (Verified  Adverse Reaction, Intermediate, RESTLESS LEGS, 7/25/ 18)


 RESTLESS LEG SYNDROME


     Simvastatin (Verified  Adverse Reaction, Intermediate, GI INTOLERANCE, 7/25 /18)


 GI UPSET


     Codeine (Verified  Adverse Reaction, Unknown, VOMIT, 7/25/18)





Current Inpatient Medications





Current Inpatient Medications








 Medications


  (Trade)  Dose


 Ordered  Sig/Bhumika


 Route  Start Time


 Stop Time Status Last Admin


Dose Admin


 


 Cefazolin Sodium  7.5 ml @ 


 2.5 mls/min  PREOP


 IV  7/25/18 06:00


 7/25/18 18:00  7/25/18 09:52


2.5 MLS/MIN


 


 Lactated Ringer's  1,000 ml @ 


 15 mls/hr  Q24H


 IV  7/25/18 06:00


 7/26/18 05:59  7/25/18 09:05


15 MLS/HR


 


 Acetaminophen


  (Tylenol Tab)  1,000 mg  PREOP


 PO  7/25/18 06:00


 7/25/18 18:00   


 


 


 Celecoxib


  (CeleBREX CAP)  200 mg  PREOP


 PO  7/25/18 06:00


 7/25/18 18:00   


 


 


 Gabapentin


  (Neurontin Cap)  300 mg  PREOP


 PO  7/25/18 06:00


 7/25/18 18:00   


 


 


 Promethazine HCl


 12.5 mg/Sodium


 Chloride  50.5 ml @ 


 202 mls/hr  Q6H  PRN


 IV  7/25/18 12:15


 8/24/18 12:14   


 


 


 Ondansetron HCl


  (Zofran Inj)  4 mg  Q6H  PRN


 IV  7/25/18 12:15


 8/24/18 12:14   


 


 


 Metoclopramide HCl


  (Reglan Inj)  10 mg  Q6H  PRN


 IV  7/25/18 12:15


 8/24/18 12:14   


 


 


 Lorazepam


  (Ativan Tab)  0.5 mg  Q8H  PRN


 PO  7/25/18 12:15


 8/24/18 12:14   


 


 


 Lorazepam 0.5 mg/


 Syringe  0.25 ml @ 


 1 mls/min  Q8H  PRN


 IV  7/25/18 12:15


 8/24/18 12:14   


 


 


 Pneumococcal


 Polysaccharide


 Vaccine  1 ea  PRN  PRN


 N/A  7/25/18 12:15


 8/24/18 12:14   


 


 


 Influenza Virus


 Vacc Triv Types


 A&B  1 ea  PRN  PRN


 N/A  7/25/18 12:15


 8/24/18 12:14   


 


 


 Polyethylene


  (Miralax Powder


 Packet)  17 gm  Q6


 PO  7/27/18 06:00


 8/26/18 05:59   


 


 


 Bisacodyl


  (Dulcolax Supp)  10 mg  DAILY  PRN


 [place]  7/25/18 12:15


 8/24/18 12:14   


 


 


 Magnesium


 Hydroxide


  (Milk Of


 Magnesia Susp)  30 ml  DAILY  PRN


 PO  7/25/18 12:15


 8/24/18 12:14   


 


 


 Hydromorphone HCl


  (Dilaudid Inj)  0.5-1mg


 prn


 moder...  Q3H  PRN


 IV  7/25/18 14:00


 8/8/18 13:59   


 


 


 Oxycodone HCl


  (Roxicodone


 Immediate Rel Tab)  5-10mg prn


 moderate


 to sev...  Q4H  PRN


 PO  7/25/18 14:00


 8/8/18 13:59  7/25/18 14:24


10 MG


 


 Cefazolin Sodium


 2000 mg/Syringe  15 ml @ 


 3.75 mls/


 min  Q8H


 IV  7/25/18 18:00


 7/26/18 02:03   


 


 


 Sodium Chloride  1,000 ml @ 


 150 mls/hr  Q6H40M


 IV  7/25/18 14:15


 8/24/18 14:14  7/25/18 14:23


150 MLS/HR


 


 Acetaminophen


  (Tylenol Tab)  1,000 mg  Q8H  PRN


 PO  7/25/18 12:15


 8/24/18 12:14   


 


 


 Acetaminophen  100 ml @ 


 400 mls/hr  Q8H  PRN


 IV  7/25/18 12:15


 8/24/18 12:14   


 


 


 Naloxone HCl


  (Narcan Inj)  0.1 mg  Q5M  PRN


 IV  7/25/18 12:15


 8/24/18 12:14   


 


 


 Senna/Docusate


 Sodium


  (Senokot S Tab)  2 tab  HS


 PO  7/25/18 21:00


 8/24/18 20:59   


 


 


 Sodium


 Biphosphate/


 Sodium Phosphate


  (Fleet Enema)  132 ml  ONE  PRN


 [place]  7/25/18 12:15


 8/24/18 12:14   


 


 


 Hydroxyzine HCl


  (Vistaril Tab)  25 mg  Q8H  PRN


 PO  7/25/18 12:15


 8/24/18 12:14   


 


 


 Al Hydroxide/Mg


 Hydroxide


  (Maalox Susp)  30 ml  Q6H  PRN


 PO  7/25/18 12:15


 8/24/18 12:14   


 


 


 Famotidine


  (Pepcid Tab)  20 mg  Q12  PRN


 PO  7/25/18 12:15


 8/24/18 12:14   


 


 


 Diphenhydramine


 HCl


  (Benadryl Cap)  25 mg  Q6H  PRN


 PO  7/25/18 12:15


 8/24/18 12:14   


 


 


 Amlodipine


 Besylate


  (Norvasc Tab)  5 mg  QAM


 PO  7/26/18 09:00


 8/25/18 08:59   


 


 


 Atorvastatin


 Calcium


  (Lipitor Tab)  5 mg  Q2D


 PO  7/26/18 09:00


 8/25/18 08:59   


 


 


 Carvedilol


  (Coreg Tab)  12.5 mg  BID


 PO  7/25/18 21:00


 8/24/18 20:59   


 


 


 Cetirizine HCl


  (zyrTEC TAB)  10 mg  QPM


 PO  7/25/18 21:00


 8/24/18 20:59   


 


 


 Tramadol HCl


  (Ultram Tab)  25 mg  Q8H  PRN


 PO  7/25/18 12:15


 8/24/18 12:14   


 


 


 Magnesium Oxide


  (Mag-Ox Tab)  400 mg  QPM


 PO  7/25/18 21:00


 8/24/18 20:59   


 


 


 Pantoprazole


 Sodium


  (Protonix Tab)  40 mg  QAM


 PO  7/26/18 09:00


 8/25/18 08:59   


 











Review of Systems


Constitutional:  No fever, No chills


Respiratory:  + problem reported (on 2L NC post op), No shortness of breath (

while on O2)


Cardiovascular:  No chest pain


Abdomen:  No pain, No nausea, No vomiting, No diarrhea, No constipation


Musculoskeletal:  + problem reported (back pain - controlled)





Physical Exam











  Date Time  Temp Pulse Resp B/P (MAP) Pulse Ox O2 Delivery O2 Flow Rate FiO2


 


7/25/18 14:49 36.6 68 18 136/72 (93) 93 Nasal Cannula 2.0 


 


7/25/18 14:21 36.6 68 20 136/73 (94) 92  2.0 


 


7/25/18 13:50      Nasal Cannula 2.0 


 


7/25/18 13:50 36.4 69 16 138/73 (94) 96 Nasal Cannula 2.0 


 


7/25/18 13:50      Nasal Cannula 2.0 


 


7/25/18 13:05 36.2 67 16 146/74 97 Nasal Cannula 2 


 


7/25/18 12:55  64 12 146/75 97 Nasal Cannula 2 


 


7/25/18 12:45  62 14 157/78 (88) 94 Nasal Cannula 2 


 


7/25/18 12:35  63 14 152/79 (91) 100 Oxymask 10 


 


7/25/18 12:25  67 20 157/75 (110) 99 Oxymask 10 


 


7/25/18 12:19 36.6 63 9 155/75 (90) 99 Oxymask 10 


 


7/25/18 08:36 36.8 62 20 162/78 94 Room Air  








General Appearance:  no apparent distress


Head:  normocephalic, atraumatic


Eyes:  normal inspection


ENT:  hearing grossly normal


Respiratory/Chest:  lungs clear, normal breath sounds, no respiratory distress


Cardiovascular:  regular rate, rhythm, no murmur


Abdomen/GI:  normal bowel sounds, non tender, soft


Back:  + pertinent finding (dressing C/D/I to thoracic/lumbar spine region)


Extremities/Musculoskelatal:  normal inspection, no calf tenderness, no pedal 

edema


Neurologic/Psych:  alert, oriented x 3


Skin:  normal color, warm/dry





Assessment & Plan


72y/oM with hx of HTN, HLD, IBS with lactose intolerance, allergic rhinitis and 

osteoarthritis s/p T11-L4 decompression/fusion surgery day 0 evaluated per 

surgeon consultation. On 2L post op without sob or respiratory distress likely 

due to atelectasis. Encouraged to use incentive spirometer. Otherwise vitals 

wnl. Pain well controlled on tylenol and oxycodone. No other concerns at this 

time. 





Osteoarthritis s/p T11-L4 decompression/fusion


- Pain well controlled on oxycodone 5-10mg q4H PRN and tylenol


- Pt has adequate bowel regimen 


- Monitor pain and manage as indicated





Hypoxia likely due to atelectasis: normal exam


- encouraged to use IS


- Monitor clinically 


- O2 per protocol 


- Wean O2 as tolerated





HTN/HLD - BP well controlled on current regimen 


- Continue home coreg, norvasc and lipitor





Allergic Rhinitis 


- continue home zyrtec 





GERD


- continue prilosec





DVT prop: SCDs


Code: Full 


Dispo: pending clinical improvement; off O2





Resident Physician Supervision Note:





I was present with Dr. Lex James during the history and exam. I discussed the 

case with the resident and agree with the findings and plan as documented in 

the note.  Any exceptions or clarifications are listed here:


Patient's Blood pressure appears to be controlled.


Will resume her Blood pressure medicine that patient takes at home.


Patient does not have any significant active medical problems.


Will continue to monitor patient overnight.


However, if patient remains stable. Medical team can sign off after evaluating 

patient in AM 





Documented By:  Troy Welsh

## 2018-07-25 NOTE — ANESTHESIOLOGY PROGRESS NOTE
Anesthesia Post Op Note


Date & Time


Jul 25, 2018 at 13:21





Vital Signs


Pain Intensity:  4





Vital Signs Past 12 Hours








  Date Time  Temp Pulse Resp B/P (MAP) Pulse Ox O2 Delivery O2 Flow Rate FiO2


 


7/25/18 13:05 36.2 67 16 146/74 97 Nasal Cannula 2 


 


7/25/18 12:55  64 12 146/75 97 Nasal Cannula 2 


 


7/25/18 12:45  62 14 157/78 (88) 94 Nasal Cannula 2 


 


7/25/18 12:35  63 14 152/79 (91) 100 Oxymask 10 


 


7/25/18 12:25  67 20 157/75 (110) 99 Oxymask 10 


 


7/25/18 12:19 36.6 63 9 155/75 (90) 99 Oxymask 10 


 


7/25/18 08:36 36.8 62 20 162/78 94 Room Air  











Notes


Mental Status:  alert / awake / arousable, participated in evaluation


Pt Amnestic to Procedure:  Yes


Nausea / Vomiting:  adequately controlled


Pain:  adequately controlled


Airway Patency, RR, SpO2:  stable & adequate


BP & HR:  stable & adequate


Hydration State:  stable & adequate


Anesthetic Complications:  no major complications apparent

## 2018-07-25 NOTE — HISTORY AND PHYSICAL
History & Physical


Date


Jul 25, 2018.





Chief Complaint


Back and leg pain





History of Present Illness


The patient is a 72 year old male with complaints of back and leg pain





Additional History


Hepatic Disease:  No


Endocrine Disorder:  No


Kidney Disease:  No


Hypertension:  Yes


Heart Disease:  No


Bleeding Tendencies:  No


Infectious Diseases:  No





Allergies


Coded Allergies:  


     Pravastatin (Verified  Allergy, Mild, HIVES, 7/25/18)


     Molds and Smuts (Verified  Allergy, Unknown, NASAL CONGESTION, 7/25/18)


     Eszopiclone (Verified  Adverse Reaction, Intermediate, RESTLESS LEGS, 7/25/ 18)


 RESTLESS LEG SYNDROME


     Simvastatin (Verified  Adverse Reaction, Intermediate, GI INTOLERANCE, 7/25 /18)


 GI UPSET


     Codeine (Verified  Adverse Reaction, Unknown, VOMIT, 7/25/18)





Home Medications


Scheduled


Amlodipine (Norvasc), 5 MG PO QAM


Atorvastatin (Lipitor), 5 MG PO Q2D


Carvedilol (Coreg), 1 TAB PO BID


Cetirizine (Zyrtec), 10 MG PO QPM


Cinnamon (Cinnamon), 1 CAP PO QPM


Krill Oil (Krill Oil), 1 TAB PO QPM


Lorazepam (Ativan), 0.5-1 MG PO HS


Magnesium Oxide (Magnesium), 1 TAB PO QPM


Misc Natural Products ([place]), 1 CAP PO QPM


Omeprazole (Prilosec), 20 MG PO QAM


Potassium (Potassium), 1 TAB PO QPM





Scheduled PRN


Tramadol (Ultram), 25 MG PO Q8H PRN for Pain





Physical Examination


Skin:  warm/dry, no rash


Eyes:  normal inspection, EOMI, sclerae normal


ENT:  normal ENT inspection, pharynx normal


Head:  normocephalic, atraumatic


Neck:  supple, no adenopathy, trachea midline


Respiratory/Chest:  lungs clear, normal breath sounds, no respiratory distress


Cardiovascular:  regular rate, rhythm, no edema, no murmur


Abdomen / GI:  normal bowel sounds, non tender


Back:  normal inspection


Extremities:  normal inspection, normal range of motion


Neurologic/Psych:  no motor/sensory deficits, alert, normal reflexes, oriented 

x 3





Diagnosis


Lumbar spinal stenosis with neurogenic claudication





Plan of Treatment


Hardware removal L2-L4 decompression L1-L2 fusion T12 S1

## 2018-07-25 NOTE — [facility] OPERATIVE REPORT
Operative Report


Operative Date


Jul 25, 2018.





Pre-Operative Diagnosis





Lumbar spinal stenosis with neurogenic claudication





Post-Operative Diagnosis





Lumbar spinal stenosis with neurogenic claudication





Procedure(s) Performed





1.  Removal of posterior segmental instrumentation L2-L4.  2 expiration of


fusion L2-L4.  #3 lumbar decompression medial facetectomies foraminotomies


T12-L1 L1-L2 #4 posterior spinal fusion T11-L3.  #5 placement posterior


segmental instrument T11-L4.  #6 interbody fusion L1 to.  #7 placement


peek cage 8 x 22 mm L1 to.  #8 placement of local autograft in the


posterior gutters per #9 placement InFUSE: Sponge mesh graft in the


posterior gutters and ostial amp in the interbody space.





Surgeon


blanca





Assistant Surgeon(s)


Jhoan





Estimated Blood Loss


300





Description of Procedure


Patient was met with preoperatively case discussed all questions addressed.  

After informed consent obtained patient was taken to the operative suite 

underwent intubation placed in a prone position on the Demetrius table on top of 

the Angelito frame.  All bony prominences well-padded eyes inspected to ensure no 

external pressure responded at this point the lumbar spine was prepped and 

draped in a sterile fashion with sharp dissection with the assistance Bovie 

cautery was performed down to and exposing the lamina transposes of T11-T12 L1 

and instrumentation at L2-L3-L4 bilaterally.  I then proceeded with hardware L2-

L3-L4 bilaterally explain the fusion mass noting it to be intact.  Then 

performed complete laminectomy of L1 partial laminectomy of T12 addressing 

severe lateral recess foraminal disease.  Pedicle screws were then placed in T11

-T12 L1-L2 and L4 bilaterally with the assistance of fluoroscopy and 

appropriately sized lacey placed.  Through a trans-frontal approach on the right 

complete discectomy of L1-2 was performed endplates created to subcortical 

bleeding bone and a 8 x 22 mm peek cage filled with ostium bone graft tapped in 

position.  The rods were then locked in final position bilaterally.  The 

transverse processes of T11-T12 L1-L2-L3 burred to subcortical bleeding bone.  

Infuse collagen sponge mass graft local autograft placed in the posterior 

gutters.  Approximately 150 cc of Exparel injected into the musculature.  15 

round SHIMA drain inserted.  The incision was then closed with 1 Vicryl fascia 2-0 

Vicryl substantially 4 Monocryl for fast closure Steri-Strips sterile dressings 

placed.  Patient will continue PACU stable discrete please note Sowmya Jhoan was 

present throughout the entire procedure involved in patient positioning complex 

portions of the surgery and fashion closure.


I attest to the content of the Intraoperative Record and any orders documented 

therein.  Any exceptions are noted below.

## 2018-07-25 NOTE — ANESTHESIOLOGY PROGRESS NOTE
Anesthesia Progress Note


Date of Service


Jul 25, 2018.





Progress Notes


Arterial line placed in OR at 10 AM in preparation for back surgery with Dr. Fletcher. Left wrist prepped with chlorhexidine and draped with sterile towels. 

20 G angiocath placed under sterile technique utilizing sterile gloves, 

surgical hats and masks. Catheter threaded using seldinger technique with 

return of pulsatile, bright red blood. Site covered with occlusive dressing and 

taped in place. Waveform consistent with correct arterial placement. After 

placement, fingers of right hand had normal perfusion. Patient tolerated 

procedure well without complications.

## 2018-07-26 VITALS
DIASTOLIC BLOOD PRESSURE: 67 MMHG | TEMPERATURE: 97.7 F | HEART RATE: 60 BPM | SYSTOLIC BLOOD PRESSURE: 127 MMHG | OXYGEN SATURATION: 92 %

## 2018-07-26 VITALS
SYSTOLIC BLOOD PRESSURE: 120 MMHG | DIASTOLIC BLOOD PRESSURE: 66 MMHG | OXYGEN SATURATION: 91 % | TEMPERATURE: 98.96 F | HEART RATE: 66 BPM

## 2018-07-26 VITALS — SYSTOLIC BLOOD PRESSURE: 131 MMHG | DIASTOLIC BLOOD PRESSURE: 73 MMHG | HEART RATE: 65 BPM | OXYGEN SATURATION: 94 %

## 2018-07-26 VITALS
HEART RATE: 73 BPM | SYSTOLIC BLOOD PRESSURE: 128 MMHG | DIASTOLIC BLOOD PRESSURE: 68 MMHG | OXYGEN SATURATION: 92 % | TEMPERATURE: 97.7 F

## 2018-07-26 VITALS
HEART RATE: 62 BPM | DIASTOLIC BLOOD PRESSURE: 72 MMHG | SYSTOLIC BLOOD PRESSURE: 131 MMHG | OXYGEN SATURATION: 94 % | TEMPERATURE: 97.52 F

## 2018-07-26 VITALS
DIASTOLIC BLOOD PRESSURE: 66 MMHG | TEMPERATURE: 98.78 F | OXYGEN SATURATION: 91 % | SYSTOLIC BLOOD PRESSURE: 129 MMHG | HEART RATE: 72 BPM

## 2018-07-26 VITALS — SYSTOLIC BLOOD PRESSURE: 149 MMHG | DIASTOLIC BLOOD PRESSURE: 65 MMHG | HEART RATE: 69 BPM

## 2018-07-26 LAB
BASOPHILS # BLD: 0.01 K/UL (ref 0–0.2)
BASOPHILS NFR BLD: 0.1 %
BUN SERPL-MCNC: 10 MG/DL (ref 7–18)
CALCIUM SERPL-MCNC: 7.8 MG/DL (ref 8.5–10.1)
CO2 SERPL-SCNC: 27 MMOL/L (ref 21–32)
CREAT SERPL-MCNC: 0.86 MG/DL (ref 0.6–1.4)
EOS ABS #: 0.01 K/UL (ref 0–0.5)
EOSINOPHIL NFR BLD AUTO: 242 K/UL (ref 130–400)
GLUCOSE SERPL-MCNC: 113 MG/DL (ref 70–99)
HCT VFR BLD CALC: 32 % (ref 42–52)
HGB BLD-MCNC: 10.9 G/DL (ref 14–18)
IG#: 0.02 K/UL (ref 0–0.02)
IMM GRANULOCYTES NFR BLD AUTO: 14.7 %
LYMPHOCYTES # BLD: 1.99 K/UL (ref 1.2–3.4)
MCH RBC QN AUTO: 29.5 PG (ref 25–34)
MCHC RBC AUTO-ENTMCNC: 34.1 G/DL (ref 32–36)
MCV RBC AUTO: 86.7 FL (ref 80–100)
MONO ABS #: 1.8 K/UL (ref 0.11–0.59)
MONOCYTES NFR BLD: 13.3 %
NEUT ABS #: 9.75 K/UL (ref 1.4–6.5)
NEUTROPHILS # BLD AUTO: 0.1 %
NEUTROPHILS NFR BLD AUTO: 71.7 %
PMV BLD AUTO: 8.8 FL (ref 7.4–10.4)
POTASSIUM SERPL-SCNC: 3.8 MMOL/L (ref 3.5–5.1)
RED CELL DISTRIBUTION WIDTH CV: 13.5 % (ref 11.5–14.5)
RED CELL DISTRIBUTION WIDTH SD: 42.9 FL (ref 36.4–46.3)
SODIUM SERPL-SCNC: 137 MMOL/L (ref 136–145)
WBC # BLD AUTO: 13.58 K/UL (ref 4.8–10.8)

## 2018-07-26 RX ADMIN — CETIRIZINE HYDROCHLORIDE SCH MG: 10 TABLET, FILM COATED ORAL at 21:09

## 2018-07-26 RX ADMIN — CARVEDILOL SCH MG: 12.5 TABLET, FILM COATED ORAL at 09:21

## 2018-07-26 RX ADMIN — PANTOPRAZOLE SCH MG: 40 TABLET, DELAYED RELEASE ORAL at 09:20

## 2018-07-26 RX ADMIN — OXYCODONE HYDROCHLORIDE PRN MG: 5 TABLET ORAL at 09:25

## 2018-07-26 RX ADMIN — SODIUM CHLORIDE SCH MLS/HR: 900 INJECTION, SOLUTION INTRAVENOUS at 02:19

## 2018-07-26 RX ADMIN — OXYCODONE HYDROCHLORIDE PRN MG: 5 TABLET ORAL at 15:27

## 2018-07-26 RX ADMIN — STANDARDIZED SENNA CONCENTRATE AND DOCUSATE SODIUM SCH TAB: 8.6; 5 TABLET ORAL at 20:29

## 2018-07-26 RX ADMIN — Medication SCH MG: at 20:30

## 2018-07-26 RX ADMIN — AMLODIPINE BESYLATE SCH MG: 5 TABLET ORAL at 11:12

## 2018-07-26 RX ADMIN — OXYCODONE HYDROCHLORIDE PRN MG: 5 TABLET ORAL at 20:28

## 2018-07-26 RX ADMIN — CEFAZOLIN SCH MLS/MIN: 10 INJECTION, POWDER, FOR SOLUTION INTRAVENOUS at 02:15

## 2018-07-26 RX ADMIN — CARVEDILOL SCH MG: 12.5 TABLET, FILM COATED ORAL at 20:29

## 2018-07-26 RX ADMIN — OXYCODONE HYDROCHLORIDE PRN MG: 5 TABLET ORAL at 00:03

## 2018-07-26 NOTE — HOSPITALIST PROGRESS NOTE
Hospitalist Progress Note


Date of Service


Jul 26, 2018.





Subjective


Pt evaluation today including:  conversation w/ patient, conversation w/ family

, physical exam, chart review, lab review, conversation w/ consultant (Dr. Fletcher), review of inpatient medication list


Patient seen and evaluated. Pain controlled. Only some surgical site 

tenderness. Ambulating very well.


Vitals stable. Is eager to return home. Anticipated D/C on Saturday.





   Constitutional:  No fever, No chills


   ENT:  + nasal symptoms (chronic )


   Respiratory:  No cough, No shortness of breath


   Cardiovascular:  No chest pain


   Abdomen:  No pain, No nausea, No vomiting, No diarrhea, No constipation


   Musculoskeletal:  + problem reported (some incisional back pain - controlled)

, No swelling, No calf pain


   Male :  No dysuria


   Neurologic:  No numbness/tingling


   Heme:  No abnormal bleeding/bruising





Medications





Current Inpatient Medications








 Medications


  (Trade)  Dose


 Ordered  Sig/Bhumika


 Route  Start Time


 Stop Time Status Last Admin


Dose Admin


 


 Promethazine HCl


 12.5 mg/Sodium


 Chloride  50.5 ml @ 


 202 mls/hr  Q6H  PRN


 IV  7/25/18 12:15


 8/24/18 12:14   


 


 


 Ondansetron HCl


  (Zofran Inj)  4 mg  Q6H  PRN


 IV  7/25/18 12:15


 8/24/18 12:14   


 


 


 Metoclopramide HCl


  (Reglan Inj)  10 mg  Q6H  PRN


 IV  7/25/18 12:15


 8/24/18 12:14   


 


 


 Lorazepam


  (Ativan Tab)  0.5 mg  Q8H  PRN


 PO  7/25/18 12:15


 8/24/18 12:14   


 


 


 Lorazepam 0.5 mg/


 Syringe  0.25 ml @ 


 1 mls/min  Q8H  PRN


 IV  7/25/18 12:15


 8/24/18 12:14   


 


 


 Pneumococcal


 Polysaccharide


 Vaccine  1 ea  PRN  PRN


 N/A  7/25/18 12:15


 8/24/18 12:14   


 


 


 Influenza Virus


 Vacc Triv Types


 A&B  1 ea  PRN  PRN


 N/A  7/25/18 12:15


 8/24/18 12:14   


 


 


 Polyethylene


  (Miralax Powder


 Packet)  17 gm  Q6


 PO  7/27/18 06:00


 8/26/18 05:59   


 


 


 Bisacodyl


  (Dulcolax Supp)  10 mg  DAILY  PRN


 [place]  7/25/18 12:15


 8/24/18 12:14   


 


 


 Magnesium


 Hydroxide


  (Milk Of


 Magnesia Susp)  30 ml  DAILY  PRN


 PO  7/25/18 12:15


 8/24/18 12:14   


 


 


 Hydromorphone HCl


  (Dilaudid Inj)  0.5-1mg


 prn


 moder...  Q3H  PRN


 IV  7/25/18 14:00


 8/8/18 13:59   


 


 


 Oxycodone HCl


  (Roxicodone


 Immediate Rel Tab)  5-10mg prn


 moderate


 to sev...  Q4H  PRN


 PO  7/25/18 14:00


 8/8/18 13:59  7/26/18 09:25


10 MG


 


 Acetaminophen


  (Tylenol Tab)  1,000 mg  Q8H  PRN


 PO  7/25/18 12:15


 8/24/18 12:14   


 


 


 Acetaminophen  100 ml @ 


 400 mls/hr  Q8H  PRN


 IV  7/25/18 12:15


 8/24/18 12:14   


 


 


 Naloxone HCl


  (Narcan Inj)  0.1 mg  Q5M  PRN


 IV  7/25/18 12:15


 8/24/18 12:14   


 


 


 Senna/Docusate


 Sodium


  (Senokot S Tab)  2 tab  HS


 PO  7/25/18 21:00


 8/24/18 20:59  7/25/18 21:35


2 TAB


 


 Sodium


 Biphosphate/


 Sodium Phosphate


  (Fleet Enema)  132 ml  ONE  PRN


 ND  7/25/18 12:15


 8/24/18 12:14   


 


 


 Hydroxyzine HCl


  (Vistaril Tab)  25 mg  Q8H  PRN


 PO  7/25/18 12:15


 8/24/18 12:14   


 


 


 Al Hydroxide/Mg


 Hydroxide


  (Maalox Susp)  30 ml  Q6H  PRN


 PO  7/25/18 12:15


 8/24/18 12:14   


 


 


 Famotidine


  (Pepcid Tab)  20 mg  Q12  PRN


 PO  7/25/18 12:15


 8/24/18 12:14   


 


 


 Diphenhydramine


 HCl


  (Benadryl Cap)  25 mg  Q6H  PRN


 PO  7/25/18 12:15


 8/24/18 12:14   


 


 


 Amlodipine


 Besylate


  (Norvasc Tab)  5 mg  QAM


 PO  7/26/18 09:00


 8/25/18 08:59  7/26/18 11:12


5 MG


 


 Carvedilol


  (Coreg Tab)  12.5 mg  BID


 PO  7/25/18 21:00


 8/24/18 20:59  7/26/18 09:21


12.5 MG


 


 Cetirizine HCl


  (zyrTEC TAB)  10 mg  QPM


 PO  7/25/18 21:00


 8/24/18 20:59  7/25/18 21:36


10 MG


 


 Tramadol HCl


  (Ultram Tab)  25 mg  Q8H  PRN


 PO  7/25/18 12:15


 8/24/18 12:14   


 


 


 Magnesium Oxide


  (Mag-Ox Tab)  400 mg  QPM


 PO  7/25/18 21:00


 8/24/18 20:59  7/25/18 21:35


400 MG


 


 Pantoprazole


 Sodium


  (Protonix Tab)  40 mg  QAM


 PO  7/26/18 09:00


 8/25/18 08:59  7/26/18 09:20


40 MG


 


 Atorvastatin


 Calcium


  (Lipitor Tab)  5 mg  Q2D@2100


 PO  7/26/18 21:00


 8/25/18 20:59   


 


 


 Ketorolac


 Tromethamine


  (Toradol Inj)  15 mg  Q6H  PRN


 IV.  7/26/18 14:30


 7/31/18 14:29   


 











Objective


Vital Signs











  Date Time  Temp Pulse Resp B/P (MAP) Pulse Ox O2 Delivery O2 Flow Rate FiO2


 


7/26/18 11:05 36.4 62 16 131/72 (91) 94 Room Air  


 


7/26/18 10:47  65   94   


 


7/26/18 07:10      Room Air  


 


7/26/18 07:02 36.5 60 15 127/67 (87) 92 Room Air  


 


7/26/18 03:10 36.5 73 17 128/68 (88) 92 Room Air  


 


7/26/18 00:08      Room Air  


 


7/25/18 23:08 36.6 71 17 122/69 (86) 92 Room Air  


 


7/25/18 21:34  80  137/76 (96)    


 


7/25/18 17:50 36.7 78 17 135/75 (95) 90 Room Air  


 


7/25/18 16:50 36.5 71 16 151/85 (107) 94 Nasal Cannula 2.0 











Physical Exam


General Appearance:  WD/WN, no apparent distress


Eyes:  sclerae normal


ENT:  hearing grossly normal


Neck:  supple, no JVD, trachea midline


Respiratory/Chest:  lungs clear, normal breath sounds, no respiratory distress, 

no accessory muscle use


Cardiovascular:  regular rate, rhythm


Abdomen:  normal bowel sounds, non tender, soft


Extremities:  no pedal edema, no calf tenderness


Neurologic/Psychiatric:  no motor/sensory deficits, alert, oriented x 3


Skin:  normal color





Laboratory Results





Last 24 Hours








Test


  7/26/18


05:17


 


White Blood Count 13.58 K/uL 


 


Red Blood Count 3.69 M/uL 


 


Hemoglobin 10.9 g/dL 


 


Hematocrit 32.0 % 


 


Mean Corpuscular Volume 86.7 fL 


 


Mean Corpuscular Hemoglobin 29.5 pg 


 


Mean Corpuscular Hemoglobin


Concent 34.1 g/dl 


 


 


Platelet Count 242 K/uL 


 


Mean Platelet Volume 8.8 fL 


 


Neutrophils (%) (Auto) 71.7 % 


 


Lymphocytes (%) (Auto) 14.7 % 


 


Monocytes (%) (Auto) 13.3 % 


 


Eosinophils (%) (Auto) 0.1 % 


 


Basophils (%) (Auto) 0.1 % 


 


Neutrophils # (Auto) 9.75 K/uL 


 


Lymphocytes # (Auto) 1.99 K/uL 


 


Monocytes # (Auto) 1.80 K/uL 


 


Eosinophils # (Auto) 0.01 K/uL 


 


Basophils # (Auto) 0.01 K/uL 


 


RDW Standard Deviation 42.9 fL 


 


RDW Coefficient of Variation 13.5 % 


 


Immature Granulocyte % (Auto) 0.1 % 


 


Immature Granulocyte # (Auto) 0.02 K/uL 


 


Sodium Level 137 mmol/L 


 


Potassium Level 3.8 mmol/L 


 


Chloride Level 103 mmol/L 


 


Carbon Dioxide Level 27 mmol/L 


 


Anion Gap 7.0 mmol/L 


 


Blood Urea Nitrogen 10 mg/dl 


 


Creatinine 0.86 mg/dl 


 


Est Creatinine Clear Calc


Drug Dose 74.8 ml/min 


 


 


Estimated GFR (


American) 99.0 


 


 


Estimated GFR (Non-


American 85.4 


 


 


BUN/Creatinine Ratio 12.0 


 


Random Glucose 113 mg/dl 


 


Calcium Level 7.8 mg/dl 











Assessment and Plan


72y/oM with hx of HTN, HLD, IBS with lactose intolerance, allergic rhinitis and 

osteoarthritis s/p T11-L4 decompression/fusion surgery day 0 evaluated per 

surgeon consultation. On 2L post op without sob or respiratory distress likely 

due to atelectasis. Encouraged to use incentive spirometer. Otherwise vitals 

wnl. Pain well controlled on tylenol and oxycodone. No other concerns at this 

time. 





OA S/P Decompression/Fusion:


- Pain management, IVF, PT/OT, DVT Prophylaxis, and Surgical Intervention per 

primary team





Reported Hypoxia:


- No documented desatuations but maybe happened post-operatively? has 

appropriate saturations on RA at this time - lungs are clear with good aeration 

- continue incentive spirometry and ambulation within paramenters of surgery





HTN/HLD: STABLE


- Norvasc 5 mg daily; Lipitor 5 mg Q2D; Coreg 12.5 mg BID





Chronic Allergic Rhinitis:


- Zyrtec 10 mg daily





Patient is doing well post-operatively and chronic conditions are stable. 

Hospitalist service will sign-off at this time. Do not hesitate to contact us 

for any changes in clinical status.


Continued [facility] stay due to:  ambulation difficulties, other (post-surgical 

management)


Discharge planning:  home

## 2018-07-26 NOTE — ANESTHESIOLOGY PROGRESS NOTE
Anesthesia Post Op Note


Date & Time


Jul 26, 2018 at 09:35





Vital Signs





Vital Signs Past 12 Hours








  Date Time  Temp Pulse Resp B/P (MAP) Pulse Ox O2 Delivery O2 Flow Rate FiO2


 


7/26/18 07:02 36.5 60 15 127/67 (87) 92 Room Air  


 


7/26/18 03:10 36.5 73 17 128/68 (88) 92 Room Air  


 


7/26/18 00:08      Room Air  


 


7/25/18 23:08 36.6 71 17 122/69 (86) 92 Room Air  











Notes


Mental Status:  alert / awake / arousable, participated in evaluation


Pt Amnestic to Procedure:  Yes


Nausea / Vomiting:  adequately controlled


Pain:  adequately controlled


Airway Patency, RR, SpO2:  stable & adequate


BP & HR:  stable & adequate


Hydration State:  stable & adequate


Anesthetic Complications:  no major complications apparent

## 2018-07-26 NOTE — DISCHARGE INSTRUCTIONS
Discharge Instructions


Date of Service


Jul 26, 2018.





Admission


Reason for Admission:  Lumbar Spinal Stenosis





Discharge


Discharge Diagnosis / Problem:  lumbar stenosis





Discharge Goals


Goal(s):  Improve function





Activity Recommendations


Activity Limitations:  per Instructions/Follow-up section





.





Instructions / Follow-Up


Instructions / Follow-Up





ACTIVITY RECOMMENDATIONS:





SELF CARE INSTRUCTIONS AFTER THORACIC/LUMBAR FUSIONS





1.  You may walk to your tolerance.  It is good exercise for your legs and back.


      Expect some back and intermittent leg aches and pains.





2.  You may perform "counter-top" level activities (make a sandwich, mary 

with a


     project, etc.).





3.  No bending or lifting of more than 10 pounds or back twisting of any nature 

(roll


      like a log when turning in bed).





4.  You may ride in a car for 20-30 minutes at a time.  No driving until after 

your


      first visit with your doctor.





5.  Frequent changes of position and restricting sitting to 30 minutes at a 

time will


      help limit the amount of back spasms and stiffness you may experience.





6.  You may discontinue the use of ambulatory aids (cane, crutches, etc.) once 

your


      strength and confidence allow.





7.  You may  the shower and let water strike your incision when you 

arrive 


     home at least once daily.  Do not take a tub bath, sit in a hot tub or go 

into a


     swimming pool until after your first recheck in the office.








SPECIAL CARE INSTRUCTIONS:





**VERY IMPORTANT TO READ AND REVIEW**





A.  Your surgical incision has been closed with a cosmetic suture under the 


     skin that will dissolve in about 6 weeks.  In 14 days, you can use a pair 


     of clean scissors and cut the suture that is left outside of the skin at 

the 


     ends of your incision.


   1.  The small skin tapes can be removed 7 days after surgery if they 


               have not fallen off by that point.


   2.  You may keep the wound open to air as much as possible to promote


              healing after post-op day number 5 unless told otherwise by your 

doctor.


   3.  If you think the wound looks like it is becoming infected (redness or 


              worsening drainage) and/or you are experiencing fever, chill or 

worsening


              back pain and muscle spasms, contact the office so that we may 

evaluate


              you as soon as possible.





B.  Complications are uncommon, but please contact us if you have any signs or 


     symptoms of:


   1.  wound infection (fever higher than 102.5 degrees F, redness, separation


              of wound, drainage, or increasing pain from the incision) 


   2.  blood clots in legs (pain, swelling, redness and warmth in legs)


   3.  urinary tract infection (fever higher than 102.5 degrees F, burning upon


              urination or increased frequency of urination)


   4.  nerve problems (inability to walk on your toes or heels, numbness, loss 


              of bowel or bladder control)


   5.  any other symptoms that concern you





C.  Please call the office at [phone] if you have any concerns or 

questions


     about your operation or recovery.





D.  No smoking!  Smoking drastically decreases the chance of a solid fusion.





E.  Do not take any anti-inflammatory medications (Indocin, Advil, Motrin, 

Aspirin, 


     Naprosyn, etc.) as these may inhibit the chance of a solid fusion.  

Tylenol is


     okay to take for pain.








MANAGING PAIN AFTER SPINAL SURGERY





1.  Narcotic medication is intended for short-term use and will be provided for 


     surgical pain.  Surgical pain usually lasts for a period of 4-6 weeks.  

Narcotic 


     medication includes Percocet, Vicodin, Darvocet, Tylenol #3 or Lortab.





2.  Longer-term pain is more appropriately treated with non-narcotic medication 


    such as Tylenol ES.





3.  Muscle spasm is not appropriately treated with narcotics.  Muscle relaxers 

such


    as Soma, Flexeril or Skelaxin can be used along with Tylenol ES.





4.  Remember that we all live with some "aches and pains".  This is not unusual 

or 


     uncommon after an injury or as we get older.


   a.  Back pain is expected and may include muscle spasms for 4 to 6 weeks 


              after surgery.  The pain should gradually improve.  If the pain 

worsens for 


              no apparent reason, please contact the office.


   b.  Intermittent leg pain may also be experienced and should not be concerned


        about unless it worsens for no apparent reason.  If so, please contact 

the


               office.





5.  We will provide appropriate medication within the normal guidelines of 

their prescribed


     use.  We will also be very cautious and aware of potential abuse and 

extended


     duration of patients' medication needs.


   a.  Pain medications are for your comfort and to assist with sleep and


        rest so that the tissue can heal.  They are not provided in order to 

return 


              to normal activity and should not be used through the day.  To do 

so or 


              worsening pain at night can result from ongoing tissue damage and 

development


              of tolerance to the prescribed medicine.





6.  Please allow 2-3 days to process refills.  Prescriptions will not be mailed 

but must be


     picked up at the office.








FOLLOW UP VISIT:





Keep your scheduled follow-up appointment. 





Any questions, please call the office at [phone].





Current Hospital Diet


Patient's current hospital diet: Regular Diet





Discharge Diet


Recommended Diet:  Regular Diet





Procedures


Procedures Performed:  


1.  Removal of posterior segmental instrumentation L2-L4.  2 expiration of


fusion L2-L4.  #3 lumbar decompression medial facetectomies foraminotomies


T12-L1 L1-L2 #4 posterior spinal fusion T11-L3.  #5 placement posterior


segmental instrument T11-L4.  #6 interbody fusion L1 to.  #7 placement


peek cage 8 x 22 mm L1 to.  #8 placement of local autograft in the


posterior gutters per #9 placement InFUSE: Sponge mesh graft in the


posterior gutters and ostial amp in the interbody space.





Pending Studies


Studies pending at discharge:  no





Medical Emergencies








.


Who to Call and When:





Medical Emergencies:  If at any time you feel your situation is an emergency, 

please call 911 immediately.





.





Non-Emergent Contact


Non-Emergency issues call your:  Primary Care Provider


.








"Provider Documentation" section prepared by Marco Fletcher.








.

## 2018-07-26 NOTE — PROGRESS NOTE
Progress Note


Date of Service


Jul 26, 2018.





Progress Note


Back pain is controlled leg pain improved vital signs stable on exam his 

excellent strength testing is sitting in a chair appears comfortable.  

Assessment status post lumbar depression fusion per plan at this time will 

continue physical therapy advance his bowel regiment anticipate discharge home 

in next few days.

## 2018-07-27 VITALS
OXYGEN SATURATION: 92 % | TEMPERATURE: 97.52 F | HEART RATE: 66 BPM | DIASTOLIC BLOOD PRESSURE: 72 MMHG | SYSTOLIC BLOOD PRESSURE: 142 MMHG

## 2018-07-27 VITALS
DIASTOLIC BLOOD PRESSURE: 72 MMHG | OXYGEN SATURATION: 92 % | SYSTOLIC BLOOD PRESSURE: 142 MMHG | HEART RATE: 66 BPM | TEMPERATURE: 97.52 F

## 2018-07-27 RX ADMIN — Medication SCH GM: at 12:00

## 2018-07-27 RX ADMIN — OXYCODONE HYDROCHLORIDE PRN MG: 5 TABLET ORAL at 05:22

## 2018-07-27 RX ADMIN — OXYCODONE HYDROCHLORIDE PRN MG: 5 TABLET ORAL at 00:30

## 2018-07-27 RX ADMIN — Medication SCH GM: at 05:21

## 2018-07-27 RX ADMIN — OXYCODONE HYDROCHLORIDE PRN MG: 5 TABLET ORAL at 14:00

## 2018-07-27 RX ADMIN — OXYCODONE HYDROCHLORIDE PRN MG: 5 TABLET ORAL at 09:24

## 2018-07-27 RX ADMIN — AMLODIPINE BESYLATE SCH MG: 5 TABLET ORAL at 09:23

## 2018-07-27 RX ADMIN — CARVEDILOL SCH MG: 12.5 TABLET, FILM COATED ORAL at 09:23

## 2018-07-27 RX ADMIN — PANTOPRAZOLE SCH MG: 40 TABLET, DELAYED RELEASE ORAL at 10:24

## 2018-07-27 NOTE — DISCHARGE SUMMARY
Orthopedic Discharge Summary


Admission Date/Reason


Jul 25, 2018 at 09:25


Lumbar Spinal Stenosis.





Discharge Date/Disposition


Jul 27, 2018


Home





Diagnosis


Principal Diagnosis:


Lumbar spinal stenosis





Admission Physical Exam


As per Admitting History & Physical.





Hospital Course


Patient underwent lumbar decompression fusion tolerated as well as taken the 

orthopedic for postoperative.  Postop day #1 he was up and amatory progressed 

the postop day #2.  Pain well controlled SHIMA drain decreasing appropriately 

subsequently discharged home postop day #3.  Discharge orders and instructions 

can be found in the chart for further review.





Discharge Instructions


Please refer to the electronic Patient Visit Report (Discharge Instructions) 

for additional information.